# Patient Record
Sex: MALE | Race: ASIAN | NOT HISPANIC OR LATINO
[De-identification: names, ages, dates, MRNs, and addresses within clinical notes are randomized per-mention and may not be internally consistent; named-entity substitution may affect disease eponyms.]

---

## 2021-03-24 ENCOUNTER — APPOINTMENT (OUTPATIENT)
Dept: ORTHOPEDIC SURGERY | Facility: CLINIC | Age: 21
End: 2021-03-24
Payer: COMMERCIAL

## 2021-03-24 VITALS
DIASTOLIC BLOOD PRESSURE: 81 MMHG | WEIGHT: 172 LBS | BODY MASS INDEX: 25.48 KG/M2 | HEIGHT: 69 IN | SYSTOLIC BLOOD PRESSURE: 132 MMHG | HEART RATE: 88 BPM

## 2021-03-24 DIAGNOSIS — Z78.9 OTHER SPECIFIED HEALTH STATUS: ICD-10-CM

## 2021-03-24 PROBLEM — Z00.00 ENCOUNTER FOR PREVENTIVE HEALTH EXAMINATION: Status: ACTIVE | Noted: 2021-03-24

## 2021-03-24 PROCEDURE — 99204 OFFICE O/P NEW MOD 45 MIN: CPT

## 2021-03-24 PROCEDURE — 99072 ADDL SUPL MATRL&STAF TM PHE: CPT

## 2021-03-24 RX ORDER — IBUPROFEN 800 MG/1
TABLET, FILM COATED ORAL
Refills: 0 | Status: ACTIVE | COMMUNITY

## 2021-03-24 RX ORDER — DICLOFENAC SODIUM 50 MG/1
50 TABLET, DELAYED RELEASE ORAL
Qty: 60 | Refills: 0 | Status: ACTIVE | COMMUNITY
Start: 2021-03-24 | End: 1900-01-01

## 2021-03-25 NOTE — PHYSICAL EXAM
[de-identified] : Constitutional: Well-nourished, well-developed, No acute distress\par Respiratory:  Good respiratory effort, no SOB\par Lymphatic: No regional lymphadenopathy, no lymphedema\par Psychiatric: Pleasant and normal affect, alert and oriented x3\par Skin: Clean dry and intact B/L UE/LE\par Musculoskeletal: normal except where as noted in regional exam\par \par Left shoulder:\par APPEARANCE: no marked deformities, no swelling or malalignment\par POSITIVE TENDERNESS: supraspinatus\par NONTENDER:  infraspinatus, teres minor. biceps. anterior and posterior capsule. AC joint. \par ROM: full with mild painful arc past 60 degrees, no scapular winging or dyskinesia present\par RESISTIVE TESTING: MMT 4+/5 ER and empty can, 5/5 IR. painless 5/5 resisted flex/ext, horizontal abd/add \par SPECIAL TESTS: + Edmond and Neers, mildly + cross arm adduction, neg Speeds, neg Hartman's, neg Drop Arm, neg Apprehension. neg apley's scratch test\par \par Right shoulder:\par APPEARANCE: no marked deformities, no swelling or malalignment\par POSITIVE TENDERNESS: Subscapularis, anterior capsule.\par NONTENDER: supraspinatus, infraspinatus, teres minor, biceps, and AC joint. \par ROM: full with mild pain at end range of flexion, abduction and ER, no scapular winging or dyskinesia present\par RESISTIVE TESTING: Painful resisted internal rotation, mildly painful horizontal  adduction, painless 4/5 resisted flexion, empty can/ER/IR, 5/5 painless flexion, horizontal abduction\par SPECIAL TESTS: + [place]'s, + Apprehension/Relocation, mildly + cross arm adduction. neg Drop Arm, neg Edmond/Neers. neg Speeds. neg apley's scratch test\par

## 2021-03-25 NOTE — DISCUSSION/SUMMARY
[de-identified] : Discussed findings of today's exam and possible causes of patient's pain.  Educated patient on their most probable diagnosis of right shoulder pain due to subscapularis strain/tendinitis, possible partial tear but doubt complete rupture; also cannot fully rule out labral pathology of the right glenohumeral labrum; and mild left shoulder pain likely compensatory due to mild subacromial impingement.  Reviewed possible courses of treatment, and we collaboratively decided best course of treatment at this time will include conservative management.  Patient will be started on a course of physical therapy to restore normal range of motion and strength as tolerated.  Patient started on a course of oral NSAIDs, prescription given for Mobic (We discussed all possible side effects of this medication).  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  Patient feels like he is unable to perform his regular work duties due to his right shoulder pain at this time, he states his employer would prefer him to be out of work while he is able to recover from this right shoulder pain, I advised the patient I think 2 weeks out of work would be sufficient to undergo regular use of oral NSAIDs and a course of physical therapy.  We discussed appropriate activity modification regarding his MMA training and weightlifting regimen.  If patient is not able to return to work in 2 weeks would recommend follow-up at that time for reevaluation.  If patient has persisting pain despite the above measures would consider MRI of the right shoulder at that time to evaluate the subscapularis and the glenoid labrum to see if patient would benefit from cortisone injection or potential surgical consultation prior to further MMA activity.  Patient appreciates and agrees with current plan.\par \par This note was generated using dragon medical dictation software.  A reasonable effort has been made for proofreading its contents, but typos may still remain.  If there are any questions or points of clarification needed please notify my office.

## 2021-03-25 NOTE — HISTORY OF PRESENT ILLNESS
[de-identified] : Patient is here for bilateral shoulder pain that began about 2 weeks ago. He was having mild pain prior to that but continued to do weight lifting at the gym. He had an episode where he was bench pressing where he had increased pain on the right side. 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 He has tried Ibuprofen, ice, and natural supplements to treat this pain. \par \par The patient's past medical history, past surgical history, medications and allergies were reviewed by me today and documented accordingly. In addition, the patient's family and social history, which were noncontributory to this visit, were reviewed also. Intake form was reviewed. The patient has no family history of arthritis.

## 2021-04-02 ENCOUNTER — APPOINTMENT (OUTPATIENT)
Dept: ORTHOPEDIC SURGERY | Facility: CLINIC | Age: 21
End: 2021-04-02
Payer: COMMERCIAL

## 2021-04-02 PROCEDURE — 99072 ADDL SUPL MATRL&STAF TM PHE: CPT

## 2021-04-02 PROCEDURE — 99213 OFFICE O/P EST LOW 20 MIN: CPT

## 2021-04-06 NOTE — HISTORY OF PRESENT ILLNESS
[de-identified] : Patient is here for bilateral shoulder pain that began about 2 weeks ago. He was having mild pain prior to that but continued to do weight lifting at the gym. He had an episode where he was bench pressing where he had increased pain on the right side. 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 He has tried Ibuprofen, ice, and natural supplements to treat this pain. \par \par The patient's past medical history, past surgical history, medications and allergies were reviewed by me today and documented accordingly. In addition, the patient's family and social history, which were noncontributory to this visit, were reviewed also. Intake form was reviewed. The patient has no family history of arthritis.

## 2021-04-06 NOTE — PHYSICAL EXAM
[de-identified] : Constitutional: Well-nourished, well-developed, No acute distress\par Respiratory:  Good respiratory effort, no SOB\par Lymphatic: No regional lymphadenopathy, no lymphedema\par Psychiatric: Pleasant and normal affect, alert and oriented x3\par Skin: Clean dry and intact B/L UE/LE\par Musculoskeletal: normal except where as noted in regional exam\par \par Right shoulder:\par APPEARANCE: no marked deformities, no swelling or malalignment\par POSITIVE TENDERNESS: Subscapularis, anterior capsule.\par NONTENDER: supraspinatus, infraspinatus, teres minor, biceps, and AC joint. \par ROM: full with mild pain at end range of flexion, abduction and ER, no scapular winging or dyskinesia present\par RESISTIVE TESTING: Painful resisted internal rotation, mildly painful horizontal  adduction, painless 4/5 resisted flexion, empty can/ER/IR, 5/5 painless flexion, horizontal abduction\par SPECIAL TESTS: + [place]'s, + Apprehension/Relocation, mildly + cross arm adduction. neg Drop Arm, neg Edmond/Neers. neg Speeds. neg apley's scratch test\par

## 2021-04-06 NOTE — DISCUSSION/SUMMARY
[de-identified] : Patient was seen today for reevaluation of right shoulder pain due to likely subscapularis strain.  At last visit we discussed activity modification and physical therapy.  Patient states he has been trying to do a home exercise program since last eval, however he continues to have pain with any far reaching or heavy lifting activity with his right shoulder.  At this time patient would like to proceed with MRI of the right shoulder to evaluate for rotator cuff tear.  I advised the patient we will try to obtain insurance authorization and he can follow-up once MRI results are available.  Patient appreciates and agrees with current plan.\par \par This note was generated using dragon medical dictation software.  A reasonable effort has been made for proofreading its contents, but typos may still remain.  If there are any questions or points of clarification needed please notify my office.\par

## 2021-04-07 ENCOUNTER — APPOINTMENT (OUTPATIENT)
Dept: ORTHOPEDIC SURGERY | Facility: CLINIC | Age: 21
End: 2021-04-07

## 2021-04-12 ENCOUNTER — NON-APPOINTMENT (OUTPATIENT)
Age: 21
End: 2021-04-12

## 2021-04-14 ENCOUNTER — APPOINTMENT (OUTPATIENT)
Dept: ORTHOPEDIC SURGERY | Facility: CLINIC | Age: 21
End: 2021-04-14
Payer: COMMERCIAL

## 2021-04-14 DIAGNOSIS — S46.811A STRAIN OF OTHER MUSCLES, FASCIA AND TENDONS AT SHOULDER AND UPPER ARM LEVEL, RIGHT ARM, INITIAL ENCOUNTER: ICD-10-CM

## 2021-04-14 PROCEDURE — 99213 OFFICE O/P EST LOW 20 MIN: CPT | Mod: 25

## 2021-04-14 PROCEDURE — 99072 ADDL SUPL MATRL&STAF TM PHE: CPT

## 2021-04-14 PROCEDURE — 76942 ECHO GUIDE FOR BIOPSY: CPT

## 2021-04-14 PROCEDURE — 20550 NJX 1 TENDON SHEATH/LIGAMENT: CPT | Mod: RT

## 2021-04-14 NOTE — PHYSICAL EXAM
[de-identified] : Constitutional: Well-nourished, well-developed, No acute distress\par Respiratory:  Good respiratory effort, no SOB\par Lymphatic: No regional lymphadenopathy, no lymphedema\par Psychiatric: Pleasant and normal affect, alert and oriented x3\par Skin: Clean dry and intact B/L UE/LE\par Musculoskeletal: normal except where as noted in regional exam\par \par Right shoulder:\par APPEARANCE: no marked deformities, no swelling or malalignment\par POSITIVE TENDERNESS: Subscapularis, anterior capsule.\par NONTENDER: supraspinatus, infraspinatus, teres minor, biceps, and AC joint. \par ROM: full with mild pain at end range of flexion, abduction and ER, no scapular winging or dyskinesia present\par RESISTIVE TESTING: Painful resisted internal rotation, mildly painful horizontal  adduction, painless 4/5 resisted flexion, empty can/ER/IR, 5/5 painless flexion, horizontal abduction\par SPECIAL TESTS: + [place]'s, + Apprehension/Relocation, mildly + cross arm adduction. neg Drop Arm, neg Edmond/Neers. neg Speeds. neg apley's scratch test\par

## 2021-04-14 NOTE — PROCEDURE
[de-identified] : Ultrasound-Guided Injection: Right shoulder subscapularis tendon Sheath.\par Indication for ultrasound guidance:  Ensure placement within the small/narrow tendon sheath without damage to the tendon\par Indication for injection: Subscapularis tendinosis\par \par A discussion was had with the patient regarding this procedure and all questions were answered. All risks, benefits and alternatives were discussed. These included but were not limited to bleeding, infection, and allergic reaction.  A timeout was done to ensure correct side and pt agreed to the procedure.   Betadine was used to sterilize and prep the area, and alcohol was used to clean the skin in the anterior aspect of the shoulder. Ethyl chloride spray was then used as a topical anesthetic. The subscapularis tendon was visualized utlizing the Sonosite II Edge, Linear transducer with the use of sterile gel. \par  The subscapularis was visualized in the transverse axis and an in-plane approach was used for the injection.  A 25-gauge 1.5" needle was used to inject 2cc of 0.25% bupivacaine without epinephrine and 1cc of 40mg/ml methylprednisolone into the subscapularis tendon sheath. An image confirming the correct location of the needle placement and infusion of the steroid at the end of the injection was saved on the local device.  A sterile bandage was then applied. The patient tolerated the procedure well and there were no complications.

## 2021-04-14 NOTE — DISCUSSION/SUMMARY
[de-identified] : Patient was seen today for reevaluation of persisting right shoulder pain.  We try to obtain insurance authorization for MRI of the right shoulder but it was denied after [id number] as patient did not undergo 6 weeks of formal conservative management.  Patient is still having pain and wanted to have treatment/intervention to help alleviate pain.  We discussed various treatment options as well as associated risk/benefits/alternatives and patient elected to proceed with ultrasound-guided subscapularis tendon sheath cortisone injection today (see procedure note).  Informed the patient that the numbing medicine in today's injection will last for about 4-6 hours. The steroid that was injected will start to work in 1 to 2 days, peak at 1-2 weeks, and may last up to 1-2 months.  Patient will continue with his course of physical therapy.  Follow up as needed.  Patient appreciates and agrees with current plan.\par \par This note was generated using dragon medical dictation software.  A reasonable effort has been made for proofreading its contents, but typos may still remain.  If there are any questions or points of clarification needed please notify my office.\par

## 2021-07-02 ENCOUNTER — APPOINTMENT (OUTPATIENT)
Dept: ORTHOPEDIC SURGERY | Facility: CLINIC | Age: 21
End: 2021-07-02
Payer: COMMERCIAL

## 2021-07-02 DIAGNOSIS — M75.42 IMPINGEMENT SYNDROME OF LEFT SHOULDER: ICD-10-CM

## 2021-07-02 DIAGNOSIS — M75.41 IMPINGEMENT SYNDROME OF RIGHT SHOULDER: ICD-10-CM

## 2021-07-02 PROCEDURE — 20610 DRAIN/INJ JOINT/BURSA W/O US: CPT | Mod: RT

## 2021-07-02 PROCEDURE — 99213 OFFICE O/P EST LOW 20 MIN: CPT | Mod: 25

## 2021-07-02 NOTE — PHYSICAL EXAM
[de-identified] : Constitutional: Well-nourished, well-developed, No acute distress\par Respiratory:  Good respiratory effort, no SOB\par Psychiatric: Pleasant and normal affect, alert and oriented x3\par Skin: Clean dry and intact B/L UE\par Musculoskeletal: normal except where as noted in regional exam\par \par \par Left Shoulder:\par APPEARANCE: no marked deformities, no swelling or malalignment\par POSITIVE TENDERNESS: none\par NONTENDER: supraspinatus, infraspinatus, teres minor, LH biceps, anterior and posterior capsule, AC joint\par ROM: full & painless, no scapular winging or dyskinesia present\par RESISTIVE TESTING: painless 5/5 resisted flex/ext, empty can/ER/IR, horizontal abd/add \par SPECIAL TESTS: neg Drop Arm, neg Empty Can, neg Edmond/Neers, neg Hartman's, neg Speeds, neg Apprehension, neg cross arm adduction, neg apley's scratch test\par \par Right Shoulder:\par APPEARANCE: no marked deformities, no swelling or malalignment\par POSITIVE TENDERNESS: supraspinatus, long head biceps tendon\par NONTENDER:  infraspinatus, teres minor. biceps. anterior and posterior capsule. AC joint. \par ROM: full with mild painful arc past 60 degrees, no scapular winging or dyskinesia present\par RESISTIVE TESTING: MMT 4+/5 ER, Flexion and Empty can, 5/5 IR. painless 5/5 resisted ext, horizontal abd/add \par SPECIAL TESTS: + Edmond and Neers, mildly + cross arm adduction, + Speeds, neg Hartman's, neg Drop Arm, neg Apprehension. neg apley's scratch test\par \par

## 2021-07-02 NOTE — HISTORY OF PRESENT ILLNESS
[de-identified] : Patient is here for right shoulder pain follow up. He states that the injection helped the pain he was feeling on the anterior portion of his shoulder. He attended PT and has noticed improvement. He still has pain with certain movements on the posterior aspect of his shoulder. There has been no recent injury. He is not taking pain medication.

## 2021-07-02 NOTE — DISCUSSION/SUMMARY
[de-identified] : Patient was seen today for reevaluation of right shoulder pain, he had very good relief from cortisone injection provided into the subscapularis and subsequent physical therapy.  He has significant improvement in his overall shoulder function, however he does have some superior and posterior shoulder pain at times.  He is requesting a cortisone injection today.  Patient has some symptoms of mild subacromial impingement, he is advised that this is primarily managed with physical therapy, however he feels if he can get a cortisone injection he may get him to the point where he would be completely pain-free.  We discussed various treatment options as well as associated risk/benefits/alternatives and patient elected to proceed with right subacromial cortisone injection today (see procedure note).  Informed the patient that the numbing medicine in today's injection will last for about 4-6 hours. The steroid that was injected will start to work in 1 to 2 days, peak at 1-2 weeks, and may last up to 1-2 months.  Patient will continue course of physical therapy.  Follow up as needed.  Patient appreciates and agrees with current plan.\par \par This note was generated using dragon medical dictation software.  A reasonable effort has been made for proofreading its contents, but typos may still remain.  If there are any questions or points of clarification needed please notify my office.\par

## 2021-07-02 NOTE — PROCEDURE
[de-identified] : Injection: Right  Shoulder Subacromial Space.\par Indication: Impingement.\par \par A discussion was had with the patient regarding this procedure and all questions were answered. All risks, benefits and alternatives were discussed. These included but were not limited to bleeding, infection, and allergic reaction.  A timeout was done to ensure correct side and pt agreed to the procedure.   Alcohol was used to clean the skin, and betadine was used to sterilize and prep the area in the posterior aspect of the shoulder. Ethyl chloride spray was then used as a topical anesthetic. A 22-gauge 1.5" needle was used to inject 2cc of 0.25% bupivacaine without epinephrine and 1cc of 40mg/ml methylprednisolone into the subacromial space. A sterile bandage was then applied. The patient tolerated the procedure well and there were no complications.\par

## 2021-08-12 ENCOUNTER — APPOINTMENT (OUTPATIENT)
Dept: ORTHOPEDIC SURGERY | Facility: CLINIC | Age: 21
End: 2021-08-12
Payer: COMMERCIAL

## 2021-08-12 VITALS
DIASTOLIC BLOOD PRESSURE: 75 MMHG | HEART RATE: 80 BPM | BODY MASS INDEX: 25.48 KG/M2 | SYSTOLIC BLOOD PRESSURE: 124 MMHG | HEIGHT: 69 IN | WEIGHT: 172 LBS

## 2021-08-12 DIAGNOSIS — M65.811 OTHER SYNOVITIS AND TENOSYNOVITIS, RIGHT SHOULDER: ICD-10-CM

## 2021-08-12 PROCEDURE — 99213 OFFICE O/P EST LOW 20 MIN: CPT | Mod: 25

## 2021-08-12 PROCEDURE — 20550 NJX 1 TENDON SHEATH/LIGAMENT: CPT | Mod: RT

## 2021-08-12 PROCEDURE — 76942 ECHO GUIDE FOR BIOPSY: CPT

## 2021-08-12 NOTE — PHYSICAL EXAM
[de-identified] : Constitutional: Well-nourished, well-developed, No acute distress\par Respiratory:  Good respiratory effort, no SOB\par Psychiatric: Pleasant and normal affect, alert and oriented x3\par Skin: Clean dry and intact B/L UE\par Musculoskeletal: normal except where as noted in regional exam\par \par Right shoulder:\par APPEARANCE: no marked deformities, no swelling or malalignment\par POSITIVE TENDERNESS: Anterior shoulder, subscapularis insertion\par NONTENDER: supraspinatus, infraspinatus, teres minor, LH biceps, anterior and posterior capsule, AC joint \par ROM: full & painless, no scapular winging or dyskinesia present\par RESISTIVE TESTING: + Pain with resisted internal rotation, otherwise painless 5/5 resisted flex/ext, empty can/ER, horizontal abd/add \par SPECIAL TESTS: neg Drop Arm, neg Empty Can, neg Edmond/Neers, neg Hartman's, neg Speeds, neg Apprehension, neg cross arm adduction, neg apley's scratch test\par Vasc: 2+ radial pulse\par Neuro: AIN, PIN, Ulnar nerve intact to motor, DTRs 2+/4 biceps, triceps, brachioradialis\par Sensation: Intact to light touch throughout\par

## 2021-08-12 NOTE — DISCUSSION/SUMMARY
[de-identified] : Patient was seen today for reevaluation of chronic right shoulder pain with recent atraumatic exacerbation.  Patient has return of pain overlying the anterior shoulder region with pain with resistive testing of the subscapularis.  Patient is requesting a repeat cortisone injection today.  I had a lengthy discussion with the patient that he is over utilizing cortisone injections, I do not recommend so frequent injections in such a young patient, particularly a highly active 1.  He is advised that physical therapy is the primary mainstay of treatment for this tendinosis.  However, patient continues to request repeat cortisone injection, he feels if he can minimize/eliminate his pain he could get more benefit from physical therapy.  Patient is advised that this would be his third injection into the shoulder region within a few months, I would not permit any further cortisone injections if we decided to proceed with injection today.  We discussed various treatment options as well as associated risk/benefits/alternatives and patient elected to proceed with ultrasound-guided right subscapularis tendon sheath cortisone injection today (see procedure note).  Informed the patient that the numbing medicine in today's injection will last for about 4-6 hours. The steroid that was injected will start to work in 1 to 2 days, peak at 1-2 weeks, and may last up to 1-2 months.  Patient will be started on a course of physical therapy to restore normal range of motion and strength as tolerated.  Follow up as needed.  Patient appreciates and agrees with current plan.\par \par This note was generated using dragon medical dictation software.  A reasonable effort has been made for proofreading its contents, but typos may still remain.  If there are any questions or points of clarification needed please notify my office.\par

## 2021-08-12 NOTE — PROCEDURE
[de-identified] : Ultrasound-Guided Injection: Right shoulder subscapularis tendon Sheath.\par Indication for ultrasound guidance:  Ensure placement within the small/narrow tendon sheath without damage to the tendon\par Indication for injection: Subscapularis tendinosis\par \par A discussion was had with the patient regarding this procedure and all questions were answered. All risks, benefits and alternatives were discussed. These included but were not limited to bleeding, infection, and allergic reaction.  A timeout was done to ensure correct side and pt agreed to the procedure.   Betadine was used to sterilize and prep the area, and alcohol was used to clean the skin in the anterior aspect of the shoulder. Ethyl chloride spray was then used as a topical anesthetic. The subscapularis tendon was visualized utlizing the Sonosite II Edge, Linear transducer with the use of sterile gel. \par  The subscapularis was visualized in the transverse axis and an in-plane approach was used for the injection.  A 25-gauge 1.5" needle was used to inject 2cc of 0.25% bupivacaine without epinephrine and 1cc of 40mg/ml methylprednisolone into the subscapularis tendon sheath. An image confirming the correct location of the needle placement and infusion of the steroid at the end of the injection was saved on the local device.  A sterile bandage was then applied. The patient tolerated the procedure well and there were no complications.

## 2021-11-23 ENCOUNTER — APPOINTMENT (OUTPATIENT)
Dept: ORTHOPEDIC SURGERY | Facility: CLINIC | Age: 21
End: 2021-11-23